# Patient Record
Sex: FEMALE | HISPANIC OR LATINO | Employment: UNEMPLOYED | URBAN - NONMETROPOLITAN AREA
[De-identification: names, ages, dates, MRNs, and addresses within clinical notes are randomized per-mention and may not be internally consistent; named-entity substitution may affect disease eponyms.]

---

## 2024-02-28 ENCOUNTER — OFFICE VISIT (OUTPATIENT)
Dept: FAMILY MEDICINE CLINIC | Facility: CLINIC | Age: 24
End: 2024-02-28
Payer: COMMERCIAL

## 2024-02-28 VITALS
HEIGHT: 63 IN | BODY MASS INDEX: 36.93 KG/M2 | HEART RATE: 95 BPM | WEIGHT: 208.4 LBS | TEMPERATURE: 98.4 F | SYSTOLIC BLOOD PRESSURE: 100 MMHG | DIASTOLIC BLOOD PRESSURE: 54 MMHG | OXYGEN SATURATION: 97 %

## 2024-02-28 DIAGNOSIS — Z30.9 ENCOUNTER FOR CONTRACEPTIVE MANAGEMENT, UNSPECIFIED TYPE: Primary | ICD-10-CM

## 2024-02-28 DIAGNOSIS — L70.9 ACNE, UNSPECIFIED ACNE TYPE: ICD-10-CM

## 2024-02-28 DIAGNOSIS — Z23 ENCOUNTER FOR IMMUNIZATION: ICD-10-CM

## 2024-02-28 PROCEDURE — 90471 IMMUNIZATION ADMIN: CPT

## 2024-02-28 PROCEDURE — 90686 IIV4 VACC NO PRSV 0.5 ML IM: CPT

## 2024-02-28 PROCEDURE — 99204 OFFICE O/P NEW MOD 45 MIN: CPT | Performed by: FAMILY MEDICINE

## 2024-02-28 NOTE — PROGRESS NOTES
Name: Dunia Sanchez      : 2000      MRN: [id number]  Encounter Provider: Ganesh Matt MD  Encounter Date: 2024   [facility]    Assessment & Plan     1. Encounter for contraceptive management, unspecified type  Comments:  Due to hx of blood clots, cannot use estrogen containing contraceptives  Pt has tried Depo, IUD, and Nexplanon; all nontolerable  Will refer to gyn at this time  Orders:  -     Ambulatory Referral to Obstetrics / Gynecology; Future    2. Encounter for immunization  -     influenza vaccine, quadrivalent, 0.5 mL, preservative-free, for adult and pediatric patients 6 mos+ (AFLURIA, FLUARIX, FLULAVAL, FLUZONE)    3. Acne, unspecified acne type  Comments:  B/L cheeks and forehead  Will trial benzoyl peroxide at this time  Orders:  -     benzoyl peroxide (BREVOXYL) 4 % gel; Apply topically daily at bedtime           Subjective     22 y/o F with no PMHx and not on any medication presenting to the clinic to establish care. Patient would like to discuss contraceptive options. In the past tried Nexplanon implant from  - , and was stopped due to swollen legs and had blood clots seen via US at [facility] in [place], [place].   IUD placed in  after 1st child and was removed due to headaches, menstrual irregularities, pelvic pain all of which resolved after removal.   Tried Depo injection in  and stopped that in  after menstrual irregularities (dysmenorrhea, menorrhagia), similar to above.     Does not follow with obgyn.   Does not recall having pap smear at age 20.  Has 2 children via .    Age of Menarche: 11 years old  Last Menstrual Period: Beginning 2024  Menstrual Pattern: Regular    Cycle length: 28 days   Duration of flow: 5 days     Current Method of Contraception: Male Condom  Most recent pap result: n/a  History of abnormal pap smears: n/a  History of STI: No     Of note, in , pt was walking down  stairs and had on big shoes and mechanically tripped over herself and hit her left leg onto concrete stairs. And since then, there has been an area of swelling below the patella. No difficulty walking, nontender to palpation.       Review of Systems   Constitutional:  Negative for chills and fever.   HENT:  Negative for ear pain and sore throat.    Eyes:  Negative for pain and visual disturbance.   Respiratory:  Negative for cough and shortness of breath.    Cardiovascular:  Negative for chest pain and palpitations.   Gastrointestinal:  Negative for abdominal pain and vomiting.   Genitourinary:  Negative for dysuria and hematuria.   Musculoskeletal:  Negative for arthralgias and back pain.   Skin:  Negative for color change and rash.   Neurological:  Negative for seizures and syncope.       History reviewed. No pertinent past medical history.  History reviewed. No pertinent surgical history.  Family History   Problem Relation Age of Onset    Breast cancer Mother 52    Pancreatic cancer Maternal Grandmother      Social History     Socioeconomic History    Marital status: Legally      Spouse name: None    Number of children: None    Years of education: None    Highest education level: None   Occupational History    None   Tobacco Use    Smoking status: Never    Smokeless tobacco: Never   Vaping Use    Vaping status: Never Used   Substance and Sexual Activity    Alcohol use: Never    Drug use: Never    Sexual activity: Yes     Partners: Male     Birth control/protection: Condom Male     Comment: 1 partner in last 6 months   Other Topics Concern    None   Social History Narrative    None     Social Determinants of Health     Financial Resource Strain: Not on file   Food Insecurity: Not on file   Transportation Needs: Not on file   Physical Activity: Not on file   Stress: Not on file   Social Connections: Not on file   Intimate Partner Violence: Not on file   Housing Stability: Not on file     No current  "outpatient medications on file prior to visit.     No Known Allergies  Immunization History   Administered Date(s) Administered    Influenza, injectable, quadrivalent, preservative free 0.5 mL 02/28/2024       Objective     /54   Pulse 95   Temp 98.4 °F (36.9 °C) (Tympanic)   Ht 5' 3\" (1.6 m)   Wt 94.5 kg (208 lb 6.4 oz)   SpO2 97%   BMI 36.92 kg/m²     Physical Exam  Vitals reviewed.   Constitutional:       General: She is not in acute distress.     Appearance: She is obese. She is not ill-appearing.   HENT:      Head: Normocephalic.      Nose: Nose normal. No rhinorrhea.   Eyes:      General: No scleral icterus.     Extraocular Movements: Extraocular movements intact.   Cardiovascular:      Rate and Rhythm: Normal rate and regular rhythm.      Pulses: Normal pulses.      Heart sounds: Normal heart sounds. No murmur heard.  Pulmonary:      Effort: Pulmonary effort is normal. No respiratory distress.      Breath sounds: Normal breath sounds. No wheezing.   Abdominal:      Palpations: Abdomen is soft.      Tenderness: There is no abdominal tenderness.   Musculoskeletal:         General: No swelling.      Right lower leg: No edema.      Left lower leg: No edema.      Comments: Negative Rosalba's sign b/l  Area of likely hematoma below left patella. NonTTP, non erythematous, non draining, not warm.   Skin:     General: Skin is warm.      Capillary Refill: Capillary refill takes less than 2 seconds.      Coloration: Skin is not jaundiced.      Comments: Mild acne seen on cheeks and forehead   Neurological:      Mental Status: She is alert and oriented to person, place, and time.   Psychiatric:         Mood and Affect: Mood normal.         Behavior: Behavior normal.       Ganesh Matt MD    "

## 2024-02-29 PROBLEM — I82.5Z3 CHRONIC DEEP VEIN THROMBOSIS (DVT) OF DISTAL VEIN OF BOTH LOWER EXTREMITIES (HCC): Status: ACTIVE | Noted: 2024-02-29

## 2024-03-11 ENCOUNTER — TELEPHONE (OUTPATIENT)
Dept: FAMILY MEDICINE CLINIC | Facility: CLINIC | Age: 24
End: 2024-03-11

## 2024-03-11 NOTE — TELEPHONE ENCOUNTER
Patient stopped by the office to check on the status of her medical records. She wanted to see if we received them yet from her doctor in [place].  Patient is waiting on those records to be able to get her 's permit.  Patient is going to call her old doctor to find out if they sent them yet. I let the patient know that when we receive them we will let her know.

## 2024-04-05 ENCOUNTER — TELEPHONE (OUTPATIENT)
Dept: FAMILY MEDICINE CLINIC | Facility: CLINIC | Age: 24
End: 2024-04-05

## 2024-04-05 NOTE — TELEPHONE ENCOUNTER
Patient called in asking if we received records from doctor in [place]. I informed her I didn't see anything scanned into to chart. Patient is going to call again and see if they can be faxed over prior to appointment on 4/12

## 2024-04-17 ENCOUNTER — TELEPHONE (OUTPATIENT)
Dept: FAMILY MEDICINE CLINIC | Facility: CLINIC | Age: 24
End: 2024-04-17

## 2024-04-17 NOTE — TELEPHONE ENCOUNTER
Patient stopped by here, she patient asks if the doctor can send her an order to have blood test. She says she has been feeling a lot of headaches, constantly ovulating outside her period and changes in her skin such as dryness and a very oily face, she asks If the doctor can prescribe something for her skin that can help her with this and that her insurance will cover it, she has tried some creams and soap over the counter but it has had no effect.

## 2024-04-17 NOTE — TELEPHONE ENCOUNTER
----- Message from Yane Goff MD sent at 4/16/2024  3:26 PM EDT -----  Please note that PCP did receive and review the records from her previous provider in [place].

## 2024-04-18 ENCOUNTER — TELEPHONE (OUTPATIENT)
Dept: FAMILY MEDICINE CLINIC | Facility: CLINIC | Age: 24
End: 2024-04-18

## 2024-04-18 DIAGNOSIS — L70.9 ACNE, UNSPECIFIED ACNE TYPE: ICD-10-CM

## 2024-04-18 NOTE — TELEPHONE ENCOUNTER
Called patient back to clarify that she can use the referral that is active to become establish with a gynecologist even if she is no longer interested in using contraceptive methods. Also to let her know that she should try  benzoyl peroxide or the doctor can make a referral to a dermatologist. She's aware, she asks if the doctor can send benzoyl one more time but to the [facility] on [address]

## 2024-04-18 NOTE — TELEPHONE ENCOUNTER
Patient's aware. She says she couldn't try benzoyl because her insurance didn't cover it and she asks if the doctor can make another referral specifying that it is due to hormonal problems since the previous referral was to start a contraceptive method in which she is no longer interested.

## 2024-07-08 ENCOUNTER — VBI (OUTPATIENT)
Dept: ADMINISTRATIVE | Facility: OTHER | Age: 24
End: 2024-07-08

## 2024-07-08 NOTE — TELEPHONE ENCOUNTER
07/08/24 7:27 AM     Chart reviewed for Pap Smear (HPV) aka Cervical Cancer Screening ; nothing is submitted to the patient's insurance at this time.     Nasra Colbert   [facility]

## 2024-12-30 ENCOUNTER — TELEPHONE (OUTPATIENT)
Dept: ADMINISTRATIVE | Facility: OTHER | Age: 24
End: 2024-12-30

## 2024-12-31 NOTE — TELEPHONE ENCOUNTER
12/31/24 10:37 AM     Chart reviewed for Pap Smear (HPV) aka Cervical Cancer Screening was/were submitted to the patient's insurance.     Abbie Tejeda   [facility]

## 2024-12-31 NOTE — TELEPHONE ENCOUNTER
Upon review of the In Basket request we were able to locate, review, and update the patient chart as requested for Pap Smear (HPV) aka Cervical Cancer Screening.    Any additional questions or concerns should be emailed to the Practice Liaisons via the appropriate education email address, please do not reply via In Basket.    Thank you  Abbie Tejeda   [facility]

## 2025-06-19 DIAGNOSIS — Z00.6 ENCOUNTER FOR EXAMINATION FOR NORMAL COMPARISON OR CONTROL IN CLINICAL RESEARCH PROGRAM: ICD-10-CM
